# Patient Record
Sex: FEMALE | Race: WHITE | NOT HISPANIC OR LATINO | Employment: PART TIME | URBAN - METROPOLITAN AREA
[De-identification: names, ages, dates, MRNs, and addresses within clinical notes are randomized per-mention and may not be internally consistent; named-entity substitution may affect disease eponyms.]

---

## 2024-03-10 NOTE — PROGRESS NOTES
Assessment/Plan:  Calcium 1200-1500mg + 600-1000 IU Vit D daily. Exercise including weight bearing exercises. Pap with high risk HPV Q 5 years.  Annual mammogram and monthly breast self exam recommended.  Pt with complaints of  vaginal dryness and/or painful intercourse.  Discussed lubricant with silicone based lubricant or coconut oil.  Trial vaginal estrogen. R/B discussed No contraindication to use.  Little absorbed systemically Pt would like to try to use 1 gram vaginally or to use small amount on finger to vulva daily x 14 days then decrease to M-W-F hold here for one month.  If improved/normal can decrease to twice a week.  Titrate to least effective dose maintaining at once per week.        Diagnoses and all orders for this visit:    Encounter for gynecological examination without abnormal finding  -     Thinprep Tis Pap, HPV mRNA E6/E7 Rfx HPV 16,18/45, Chlamydia/N.gonorrhoeae    Encounter for Papanicolaou smear for cervical cancer screening  -     Thinprep Tis Pap, HPV mRNA E6/E7 Rfx HPV 16,18/45, Chlamydia/N.gonorrhoeae    History of breast cancer    Encounter for screening mammogram for malignant neoplasm of breast  -     Mammo screening bilateral w 3d & cad; Future    Personal history of breast cancer  -     Mammo screening bilateral w 3d & cad; Future    Family history of breast cancer  -     Mammo screening bilateral w 3d & cad; Future    Vaginal atrophy  -     estradiol (ESTRACE VAGINAL) 0.1 mg/g vaginal cream; 1 gram in the vaginal daily for 14 days then three times per week    Polyarthritis with positive rheumatoid factor (HCC)    Primary Sjogren's syndrome (HCC)    Screen for STD (sexually transmitted disease)  -     Thinprep Tis Pap, HPV mRNA E6/E7 Rfx HPV 16,18/45, Chlamydia/N.gonorrhoeae    Other orders  -     Liquid-based pap, screening  -     CANNABIDIOL PO; THIS ENTRY IS FOR DOCUMENTATION PURPOSES OF MEDICAL CANNABIS USE ONLY AND DOES NOT INDICATE THE PROVIDER OF NOTE PRESCRIBED THIS  "MEDICATION. THIS ENTRY MAY NOT BE USED TO PRESCRIBE THIS PRODUCT.  -     clonazePAM (KlonoPIN) 0.5 mg tablet; Take 0.5 mg by mouth if needed  -     Cyanocobalamin 1000 MCG CAPS; Take 500 mcg by mouth daily  -     DULoxetine (CYMBALTA) 20 mg capsule; TAKE ONE CAPSULE (20mg) BY MOUTH EVERY DAY  -     hydroxychloroquine (PLAQUENIL) 200 mg tablet; Take 1 tablet by mouth every other day          Subjective:      Patient ID: Kennedi Peterson is a 64 y.o. female.    New/former pt here for annual gyn  Last seen  Sjogrens and + RF with polyarthritis. PH breast cancer  s/p Lumpectomy SLNBx RT and tamoxifen followed by Arimidex  PAP 2019 neg/neg HPV Recently in a new relationship SA having pain  Using coconut oil as lubricant         The following portions of the patient's history were reviewed and updated as appropriate: allergies, current medications, past family history, past medical history, past social history, past surgical history, and problem list.    Review of Systems   Constitutional:  Negative for fatigue and unexpected weight change.   Gastrointestinal:  Negative for constipation and diarrhea.   Genitourinary:  Negative for difficulty urinating, dyspareunia, dysuria, frequency, genital sores, menstrual problem, pelvic pain, urgency, vaginal bleeding, vaginal discharge and vaginal pain.   Neurological:  Negative for headaches.   Psychiatric/Behavioral: Negative.  Negative for dysphoric mood. The patient is not nervous/anxious.          Objective:      /62   Ht 4' 11\" (1.499 m)   Wt 53.3 kg (117 lb 6.4 oz)   Breastfeeding No   BMI 23.71 kg/m²          Physical Exam  Vitals and nursing note reviewed.   Constitutional:       General: She is not in acute distress.     Appearance: Normal appearance.   HENT:      Head: Normocephalic and atraumatic.   Pulmonary:      Effort: Pulmonary effort is normal.   Chest:   Breasts:     Breasts are symmetrical.      Right: Normal. No mass, nipple discharge, skin " change or tenderness.      Left: Normal. No mass, nipple discharge, skin change or tenderness.   Abdominal:      General: There is no distension.      Palpations: Abdomen is soft.      Tenderness: There is no abdominal tenderness. There is no guarding or rebound.   Genitourinary:     General: Normal vulva.      Exam position: Lithotomy position.      Labia:         Right: No rash, tenderness, lesion or injury.         Left: No rash, tenderness, lesion or injury.       Urethra: No prolapse, urethral pain, urethral swelling or urethral lesion.      Vagina: Normal. No erythema or lesions.      Cervix: No cervical motion tenderness, discharge, lesion or cervical bleeding.      Uterus: Normal.       Adnexa: Right adnexa normal and left adnexa normal.        Right: No mass or tenderness.          Left: No mass or tenderness.        Rectum: No mass or external hemorrhoid.      Comments: PAP from cervix   Musculoskeletal:         General: Normal range of motion.   Lymphadenopathy:      Upper Body:      Right upper body: No axillary adenopathy.      Left upper body: No axillary adenopathy.      Lower Body: No right inguinal adenopathy. No left inguinal adenopathy.   Skin:     General: Skin is warm and dry.   Neurological:      Mental Status: She is alert and oriented to person, place, and time.   Psychiatric:         Mood and Affect: Mood normal.         Behavior: Behavior normal.         Thought Content: Thought content normal.         Judgment: Judgment normal.

## 2024-03-11 ENCOUNTER — OFFICE VISIT (OUTPATIENT)
Dept: OBGYN CLINIC | Facility: CLINIC | Age: 64
End: 2024-03-11
Payer: MEDICARE

## 2024-03-11 VITALS
HEIGHT: 59 IN | DIASTOLIC BLOOD PRESSURE: 62 MMHG | WEIGHT: 117.4 LBS | BODY MASS INDEX: 23.67 KG/M2 | SYSTOLIC BLOOD PRESSURE: 102 MMHG

## 2024-03-11 DIAGNOSIS — Z85.3 PERSONAL HISTORY OF BREAST CANCER: ICD-10-CM

## 2024-03-11 DIAGNOSIS — M35.00 PRIMARY SJOGREN'S SYNDROME (HCC): ICD-10-CM

## 2024-03-11 DIAGNOSIS — Z12.4 ENCOUNTER FOR PAPANICOLAOU SMEAR FOR CERVICAL CANCER SCREENING: ICD-10-CM

## 2024-03-11 DIAGNOSIS — N95.2 VAGINAL ATROPHY: ICD-10-CM

## 2024-03-11 DIAGNOSIS — Z01.419 ENCOUNTER FOR GYNECOLOGICAL EXAMINATION WITHOUT ABNORMAL FINDING: Primary | ICD-10-CM

## 2024-03-11 DIAGNOSIS — M05.80 POLYARTHRITIS WITH POSITIVE RHEUMATOID FACTOR (HCC): ICD-10-CM

## 2024-03-11 DIAGNOSIS — Z80.3 FAMILY HISTORY OF BREAST CANCER: ICD-10-CM

## 2024-03-11 DIAGNOSIS — Z12.31 ENCOUNTER FOR SCREENING MAMMOGRAM FOR MALIGNANT NEOPLASM OF BREAST: ICD-10-CM

## 2024-03-11 DIAGNOSIS — Z11.3 SCREEN FOR STD (SEXUALLY TRANSMITTED DISEASE): ICD-10-CM

## 2024-03-11 DIAGNOSIS — Z85.3 HISTORY OF BREAST CANCER: ICD-10-CM

## 2024-03-11 PROCEDURE — G0101 CA SCREEN;PELVIC/BREAST EXAM: HCPCS | Performed by: NURSE PRACTITIONER

## 2024-03-11 RX ORDER — CLONAZEPAM 0.5 MG/1
0.5 TABLET ORAL AS NEEDED
COMMUNITY

## 2024-03-11 RX ORDER — ESTRADIOL 0.1 MG/G
CREAM VAGINAL
Qty: 42.5 G | Refills: 3 | Status: SHIPPED | OUTPATIENT
Start: 2024-03-11

## 2024-03-11 RX ORDER — HYDROXYCHLOROQUINE SULFATE 200 MG/1
1 TABLET, FILM COATED ORAL EVERY OTHER DAY
COMMUNITY
Start: 2024-01-22

## 2024-03-11 RX ORDER — DULOXETIN HYDROCHLORIDE 20 MG/1
CAPSULE, DELAYED RELEASE ORAL
COMMUNITY
Start: 2023-12-20

## 2024-03-12 NOTE — PATIENT INSTRUCTIONS
Calcium 1200-1500mg + 600-1000 IU Vit D daily. Exercise including weight bearing exercises. Pap with high risk HPV Q 5 years.  Annual mammogram and monthly breast self exam recommended.  Pt with complaints of  vaginal dryness and/or painful intercourse.  Discussed lubricant with silicone based lubricant or coconut oil.  Trial vaginal estrogen. R/B discussed No contraindication to use.  Little absorbed systemically Pt would like to try to use 1 gram vaginally or to use small amount on finger to vulva daily x 14 days then decrease to M-W-F hold here for one month.  If improved/normal can decrease to twice a week.  Titrate to least effective dose maintaining at once per week.

## 2024-03-14 LAB
C TRACH RRNA SPEC QL NAA+PROBE: NOT DETECTED
CLINICAL INFO: NORMAL
CYTO CVX: NORMAL
CYTOLOGY CMNT CVX/VAG CYTO-IMP: NORMAL
DATE PREVIOUS BX: NORMAL
HPV E6+E7 MRNA CVX QL NAA+PROBE: NOT DETECTED
LMP START DATE: NORMAL
N GONORRHOEA RRNA SPEC QL NAA+PROBE: NOT DETECTED
SL AMB PREV. PAP:: NORMAL
SPECIMEN SOURCE CVX/VAG CYTO: NORMAL

## 2024-04-30 ENCOUNTER — TELEPHONE (OUTPATIENT)
Age: 64
End: 2024-04-30

## 2024-04-30 NOTE — TELEPHONE ENCOUNTER
Pt calling asking for RN to review directions for applying Estrace vaginal cream and frequency recommended by provider. RN reviewed note from recent visit and provided advice to pt. Pt verbalized an understanding. No further questions at this time.

## 2024-09-27 LAB — HBA1C MFR BLD HPLC: 5.6 %

## 2024-11-27 ENCOUNTER — TELEPHONE (OUTPATIENT)
Age: 64
End: 2024-11-27

## 2025-01-03 ENCOUNTER — TELEPHONE (OUTPATIENT)
Dept: GASTROENTEROLOGY | Facility: CLINIC | Age: 65
End: 2025-01-03

## 2025-01-03 ENCOUNTER — OFFICE VISIT (OUTPATIENT)
Dept: GASTROENTEROLOGY | Facility: CLINIC | Age: 65
End: 2025-01-03
Payer: COMMERCIAL

## 2025-01-03 VITALS
WEIGHT: 122 LBS | HEIGHT: 60 IN | SYSTOLIC BLOOD PRESSURE: 120 MMHG | DIASTOLIC BLOOD PRESSURE: 78 MMHG | BODY MASS INDEX: 23.95 KG/M2

## 2025-01-03 DIAGNOSIS — K59.04 CHRONIC IDIOPATHIC CONSTIPATION: Primary | ICD-10-CM

## 2025-01-03 DIAGNOSIS — Z12.11 COLON CANCER SCREENING: ICD-10-CM

## 2025-01-03 PROCEDURE — 99204 OFFICE O/P NEW MOD 45 MIN: CPT | Performed by: INTERNAL MEDICINE

## 2025-01-03 RX ORDER — FERROUS SULFATE 325(65) MG
1 TABLET ORAL EVERY OTHER DAY
COMMUNITY

## 2025-01-03 RX ORDER — POLYETHYLENE GLYCOL 3350 17 G/17G
238 POWDER, FOR SOLUTION ORAL ONCE
Qty: 238 G | Refills: 0 | Status: SHIPPED | OUTPATIENT
Start: 2025-01-03 | End: 2025-01-03

## 2025-01-03 RX ORDER — PHENOL 1.4 %
600 AEROSOL, SPRAY (ML) MUCOUS MEMBRANE DAILY
COMMUNITY

## 2025-01-03 RX ORDER — SODIUM CHLORIDE, SODIUM LACTATE, POTASSIUM CHLORIDE, CALCIUM CHLORIDE 600; 310; 30; 20 MG/100ML; MG/100ML; MG/100ML; MG/100ML
125 INJECTION, SOLUTION INTRAVENOUS CONTINUOUS
OUTPATIENT
Start: 2025-01-03

## 2025-01-03 NOTE — PROGRESS NOTES
Name: Kennedi Peterson      : 1960      MRN: [id number]  Encounter Provider: Leanna Carney MD  Encounter Date: 1/3/2025   Encounter department: [facility]  :  Assessment & Plan  Chronic idiopathic constipation  64F here today as a new patient with complaints of chronic constipation and incomplete defecation. Some improvement with eating 5 figs a day but still with some difficulty cleaning after a BM. Rectal exam sig for grade 2 prolapsed hemorrhoids.     - Continue high fiber diet, OK to use figs, increase water intake  - Consider hemorrhoid banding after the colonoscopy         Colon cancer screening  Av risk, last colonoscopy 10 years ago.     Orders:  •  Colonoscopy; Future  •  polyethylene glycol (MiraLax) 17 GM/SCOOP powder; Take 238 g by mouth once for 1 dose Take 238 g my mouth. Use as directed        History of Present Illness     Kennedi Peterson is a 64 y.o. female who presents today as a new patient for constipation/incomplete defecation and difficulty keeping the anal area clean.    Pt states she is a vegetarian. Started iron supplements for low ferritin (normal hgb). But prior to this, she has noticed chronic constipation. She started some figs about 5 /day and it has helped keep her bowels softer and more regular. However, despite this, she continues to feel like incomplete evacuation and when she is wiping, she has trouble cleaning all the way. States she had surgery in past for anal skin tags.     Denies sig bleeding. No abd pains/diarrhea. No weight changes. Otherwise healthy. Does take plaquenil for RA.      History obtained from: patient    Review of Systems   Constitutional:  Negative for chills and fever.   HENT:  Negative for ear pain and sore throat.    Eyes:  Negative for pain and visual disturbance.   Respiratory:  Negative for cough and shortness of breath.    Cardiovascular:  Negative for chest pain and palpitations.   Gastrointestinal:  Positive  for constipation. Negative for abdominal pain, blood in stool, nausea and vomiting.   Genitourinary:  Negative for dysuria and hematuria.   Musculoskeletal:  Positive for arthralgias. Negative for back pain.   Skin:  Negative for color change and rash.   Neurological:  Negative for seizures and syncope.   All other systems reviewed and are negative.    Past Medical History   Past Medical History:   Diagnosis Date   • Anxiety    • Breast cancer (HCC)    • Cataracts, bilateral 2013   • Colon polyp    • Depression    • History of screening mammography 2024    Negative per pt. Done at [facility].   • RA (rheumatoid arthritis) (HCC)    • Sjogren's disease (HCC)      Past Surgical History:   Procedure Laterality Date   • BREAST LUMPECTOMY Left     malignancy   • CATARACT EXTRACTION, BILATERAL Bilateral 2013   •  SECTION     • COLONOSCOPY  2015    10 year recall   • COLONOSCOPY     • LYMPH NODE DISSECTION Left      Family History   Problem Relation Age of Onset   • Breast cancer Mother    • Colon cancer Maternal Grandmother    • Uterine cancer Neg Hx    • Ovarian cancer Neg Hx    • Colon polyps Neg Hx       reports that she has quit smoking. Her smoking use included cigarettes. She has never used smokeless tobacco. She reports current alcohol use. She reports current drug use. Drug: Marijuana.  Current Outpatient Medications on File Prior to Visit   Medication Sig Dispense Refill   • calcium carbonate (OS-ISAURA) 600 MG tablet Take 600 mg by mouth daily     • CANNABIDIOL PO THIS ENTRY IS FOR DOCUMENTATION PURPOSES OF MEDICAL CANNABIS USE ONLY AND DOES NOT INDICATE THE PROVIDER OF NOTE PRESCRIBED THIS MEDICATION. THIS ENTRY MAY NOT BE USED TO PRESCRIBE THIS PRODUCT.     • estradiol (ESTRACE VAGINAL) 0.1 mg/g vaginal cream 1 gram in the vaginal daily for 14 days then three times per week 42.5 g 3   • ferrous sulfate 325 (65 Fe) mg tablet Take 1 tablet by mouth every other day     •  hydroxychloroquine (PLAQUENIL) 200 mg tablet Take 1 tablet by mouth every other day     • [DISCONTINUED] clonazePAM (KlonoPIN) 0.5 mg tablet Take 0.5 mg by mouth if needed (Patient not taking: Reported on 1/3/2025)     • [DISCONTINUED] Cyanocobalamin 1000 MCG CAPS Take 500 mcg by mouth daily (Patient not taking: Reported on 1/3/2025)     • [DISCONTINUED] DULoxetine (CYMBALTA) 20 mg capsule TAKE ONE CAPSULE (20mg) BY MOUTH EVERY DAY (Patient not taking: Reported on 1/3/2025)       No current facility-administered medications on file prior to visit.   No Known Allergies   Current Outpatient Medications on File Prior to Visit   Medication Sig Dispense Refill   • calcium carbonate (OS-ISAURA) 600 MG tablet Take 600 mg by mouth daily     • CANNABIDIOL PO THIS ENTRY IS FOR DOCUMENTATION PURPOSES OF MEDICAL CANNABIS USE ONLY AND DOES NOT INDICATE THE PROVIDER OF NOTE PRESCRIBED THIS MEDICATION. THIS ENTRY MAY NOT BE USED TO PRESCRIBE THIS PRODUCT.     • estradiol (ESTRACE VAGINAL) 0.1 mg/g vaginal cream 1 gram in the vaginal daily for 14 days then three times per week 42.5 g 3   • ferrous sulfate 325 (65 Fe) mg tablet Take 1 tablet by mouth every other day     • hydroxychloroquine (PLAQUENIL) 200 mg tablet Take 1 tablet by mouth every other day     • [DISCONTINUED] clonazePAM (KlonoPIN) 0.5 mg tablet Take 0.5 mg by mouth if needed (Patient not taking: Reported on 1/3/2025)     • [DISCONTINUED] Cyanocobalamin 1000 MCG CAPS Take 500 mcg by mouth daily (Patient not taking: Reported on 1/3/2025)     • [DISCONTINUED] DULoxetine (CYMBALTA) 20 mg capsule TAKE ONE CAPSULE (20mg) BY MOUTH EVERY DAY (Patient not taking: Reported on 1/3/2025)       No current facility-administered medications on file prior to visit.      Social History     Tobacco Use   • Smoking status: Former     Types: Cigarettes   • Smokeless tobacco: Never   Vaping Use   • Vaping status: Never Used   Substance and Sexual Activity   • Alcohol use: Yes     Comment:  social   • Drug use: Yes     Types: Marijuana     Comment: medical   • Sexual activity: Yes     Partners: Male     Birth control/protection: Post-menopausal        Objective   /78   Ht 5' (1.524 m)   Wt 55.3 kg (122 lb)   BMI 23.83 kg/m²      Physical Exam  Vitals and nursing note reviewed.   Constitutional:       General: She is not in acute distress.     Appearance: She is well-developed.   HENT:      Head: Normocephalic and atraumatic.   Eyes:      Conjunctiva/sclera: Conjunctivae normal.   Cardiovascular:      Rate and Rhythm: Normal rate and regular rhythm.      Heart sounds: No murmur heard.  Pulmonary:      Effort: Pulmonary effort is normal. No respiratory distress.      Breath sounds: Normal breath sounds.   Abdominal:      General: Abdomen is flat. Bowel sounds are normal. There is no distension.      Palpations: Abdomen is soft.      Tenderness: There is no abdominal tenderness.   Musculoskeletal:         General: No swelling.      Cervical back: Neck supple.   Skin:     General: Skin is warm and dry.   Neurological:      General: No focal deficit present.      Mental Status: She is alert and oriented to person, place, and time.   Psychiatric:         Mood and Affect: Mood normal.

## 2025-01-03 NOTE — TELEPHONE ENCOUNTER
Scheduled date of colonoscopy (as of today):1/15/25  Physician performing colonoscopy:LISA  Location of colonoscopy:[facility]  Bowel prep reviewed with patient:Miralax/Dulcolax  Instructions reviewed with patient by:ANAYELI  Clearances: N

## 2025-01-03 NOTE — ASSESSMENT & PLAN NOTE
64F here today as a new patient with complaints of chronic constipation and incomplete defecation. Some improvement with eating 5 figs a day but still with some difficulty cleaning after a BM. Rectal exam sig for grade 2 prolapsed hemorrhoids.     - Continue high fiber diet, OK to use figs, increase water intake  - Consider hemorrhoid banding after the colonoscopy

## 2025-01-07 ENCOUNTER — TELEPHONE (OUTPATIENT)
Dept: GASTROENTEROLOGY | Facility: CLINIC | Age: 65
End: 2025-01-07

## 2025-01-07 NOTE — TELEPHONE ENCOUNTER
Procedure confirmed  Colonoscopy     Via: Spoke with patient.      Instructions given: Given to Patient at Visit     Prep Given: Miralax/Dulcolax    Call the office if there are any questions.

## 2025-01-14 RX ORDER — SODIUM CHLORIDE 9 MG/ML
100 INJECTION, SOLUTION INTRAVENOUS CONTINUOUS
Status: CANCELLED | OUTPATIENT
Start: 2025-01-14

## 2025-01-15 ENCOUNTER — HOSPITAL ENCOUNTER (OUTPATIENT)
Dept: GASTROENTEROLOGY | Facility: HOSPITAL | Age: 65
Setting detail: OUTPATIENT SURGERY
Discharge: HOME | End: 2025-01-15
Attending: INTERNAL MEDICINE
Payer: COMMERCIAL

## 2025-01-15 ENCOUNTER — ANESTHESIA (OUTPATIENT)
Dept: GASTROENTEROLOGY | Facility: HOSPITAL | Age: 65
End: 2025-01-15
Payer: COMMERCIAL

## 2025-01-15 ENCOUNTER — ANESTHESIA EVENT (OUTPATIENT)
Dept: GASTROENTEROLOGY | Facility: HOSPITAL | Age: 65
End: 2025-01-15
Payer: COMMERCIAL

## 2025-01-15 VITALS
TEMPERATURE: 97.9 F | RESPIRATION RATE: 20 BRPM | SYSTOLIC BLOOD PRESSURE: 92 MMHG | WEIGHT: 118 LBS | HEART RATE: 79 BPM | OXYGEN SATURATION: 95 % | DIASTOLIC BLOOD PRESSURE: 54 MMHG | HEIGHT: 60 IN | BODY MASS INDEX: 23.16 KG/M2

## 2025-01-15 DIAGNOSIS — Z12.11 COLON CANCER SCREENING: ICD-10-CM

## 2025-01-15 PROCEDURE — 88305 TISSUE EXAM BY PATHOLOGIST: CPT | Performed by: STUDENT IN AN ORGANIZED HEALTH CARE EDUCATION/TRAINING PROGRAM

## 2025-01-15 PROCEDURE — 45385 COLONOSCOPY W/LESION REMOVAL: CPT | Performed by: INTERNAL MEDICINE

## 2025-01-15 RX ORDER — SODIUM CHLORIDE 9 MG/ML
INJECTION, SOLUTION INTRAVENOUS CONTINUOUS PRN
Status: DISCONTINUED | OUTPATIENT
Start: 2025-01-15 | End: 2025-01-15

## 2025-01-15 RX ORDER — SODIUM CHLORIDE 9 MG/ML
100 INJECTION, SOLUTION INTRAVENOUS CONTINUOUS
Status: DISCONTINUED | OUTPATIENT
Start: 2025-01-15 | End: 2025-01-19 | Stop reason: HOSPADM

## 2025-01-15 RX ORDER — SODIUM CHLORIDE, SODIUM LACTATE, POTASSIUM CHLORIDE, CALCIUM CHLORIDE 600; 310; 30; 20 MG/100ML; MG/100ML; MG/100ML; MG/100ML
125 INJECTION, SOLUTION INTRAVENOUS CONTINUOUS
Status: DISCONTINUED | OUTPATIENT
Start: 2025-01-15 | End: 2025-01-19 | Stop reason: HOSPADM

## 2025-01-15 RX ORDER — PROPOFOL 10 MG/ML
INJECTION, EMULSION INTRAVENOUS AS NEEDED
Status: DISCONTINUED | OUTPATIENT
Start: 2025-01-15 | End: 2025-01-15

## 2025-01-15 RX ORDER — PHENYLEPHRINE HCL IN 0.9% NACL 1 MG/10 ML
SYRINGE (ML) INTRAVENOUS AS NEEDED
Status: DISCONTINUED | OUTPATIENT
Start: 2025-01-15 | End: 2025-01-15

## 2025-01-15 RX ADMIN — PROPOFOL 150 MG: 10 INJECTION, EMULSION INTRAVENOUS at 14:36

## 2025-01-15 RX ADMIN — Medication 50 MCG: at 14:42

## 2025-01-15 RX ADMIN — SODIUM CHLORIDE: 0.9 INJECTION, SOLUTION INTRAVENOUS at 13:00

## 2025-01-15 RX ADMIN — PROPOFOL 20 MG: 10 INJECTION, EMULSION INTRAVENOUS at 14:48

## 2025-01-15 RX ADMIN — PROPOFOL 50 MG: 10 INJECTION, EMULSION INTRAVENOUS at 14:40

## 2025-01-15 RX ADMIN — SODIUM CHLORIDE: 0.9 INJECTION, SOLUTION INTRAVENOUS at 14:19

## 2025-01-15 RX ADMIN — PROPOFOL 30 MG: 10 INJECTION, EMULSION INTRAVENOUS at 14:44

## 2025-01-15 NOTE — H&P
[facility]    Kennedi Peterson 64 y.o. female MRN: [id number]      HPI: Kennedi Peterson is a 64 y.o. female who presents for colon cancer screening.    No Known Allergies      REVIEW OF SYSTEMS: Per the HPI, and otherwise unremarkable.    Historical Information     Past Medical History:   Diagnosis Date    Anxiety     Breast cancer (HCC)     Cataracts, bilateral 2013    Colon polyp     Depression     History of screening mammography 2024    Negative per pt. Done at [facility].    RA (rheumatoid arthritis) (HCC)     Sjogren's disease (HCC)      Past Surgical History:   Procedure Laterality Date    BREAST LUMPECTOMY Left     malignancy    CATARACT EXTRACTION, BILATERAL Bilateral 2013     SECTION      COLONOSCOPY  2015    10 year recall    COLONOSCOPY      LYMPH NODE DISSECTION Left      Social History   Social History     Substance and Sexual Activity   Alcohol Use Yes    Comment: social     Social History     Substance and Sexual Activity   Drug Use Yes    Types: Marijuana    Comment: medical     Social History     Tobacco Use   Smoking Status Former    Types: Cigarettes   Smokeless Tobacco Never     Family History   Problem Relation Age of Onset    Breast cancer Mother     Colon cancer Maternal Grandmother     Uterine cancer Neg Hx     Ovarian cancer Neg Hx     Colon polyps Neg Hx        Meds/Allergies       Current Outpatient Medications:     calcium carbonate (OS-ISAURA) 600 MG tablet    ferrous sulfate 325 (65 Fe) mg tablet    NON FORMULARY    [employer] WORT PO    CANNABIDIOL PO    estradiol (ESTRACE VAGINAL) 0.1 mg/g vaginal cream    hydroxychloroquine (PLAQUENIL) 200 mg tablet    polyethylene glycol (MiraLax) 17 GM/SCOOP powder    Current Facility-Administered Medications:     lactated ringers infusion, 125 mL/hr, Intravenous, Continuous    sodium chloride 0.9 % infusion, 100 mL/hr, Intravenous, Continuous    Facility-Administered  Medications Ordered in Other Encounters:     sodium chloride 0.9 % infusion, , Intravenous, Continuous PRN, New Bag at 01/15/25 1300        Objective     /58   Pulse 71   Temp 97.9 °F (36.6 °C) (Temporal)   Resp 16   Ht 5' (1.524 m)   Wt 53.5 kg (118 lb)   SpO2 100%   BMI 23.05 kg/m²       PHYSICAL EXAM    Gen: NAD AAOx3  Head: Normocephalic, Atraumatic  CV: S1S2 RRR no m/r/g  CHEST: Clear b/l no c/r/w  ABD: soft, +BS NT/ND no masses  EXT: no edema      ASSESSMENT/PLAN:  This is a 64 y.o. year old female here for colonoscopy, and she is stable and optimized for her procedure.

## 2025-01-15 NOTE — ANESTHESIA PREPROCEDURE EVALUATION
Procedure:  COLONOSCOPY    Relevant Problems   ANESTHESIA (within normal limits)      CARDIO (within normal limits)      GYN   (+) Breast cancer (HCC) (left)      NEURO/PSYCH   (+) Anxiety   (+) Depression      PULMONARY  Medical marijuana use - smokes   (-) Sleep apnea   (-) Smoking   (-) URI (upper respiratory infection)      Rheumatology   (+) Polyarthritis with positive rheumatoid factor (HCC)   (+) Primary Sjogren's syndrome (HCC)      FEN/Gastrointestinal   (+) Chronic idiopathic constipation      Physical Exam    Airway    Mallampati score: I  TM Distance: >3 FB  Neck ROM: full     Dental   No notable dental hx     Cardiovascular      Pulmonary      Other Findings  post-pubertal.    Lab Results   Component Value Date    HGBA1C 5.6 09/27/2024     Anesthesia Plan  ASA Score- 2     Anesthesia Type- IV sedation with anesthesia with ASA Monitors.         Additional Monitors:     Airway Plan:            Plan Factors-Exercise tolerance (METS): >4 METS.    Chart reviewed.   Existing labs reviewed. Patient summary reviewed.    Patient is a current smoker (MMALBERT).              Induction- intravenous.    Postoperative Plan-         Informed Consent- Anesthetic plan and risks discussed with patient.  I personally reviewed this patient with the CRNA. Discussed and agreed on the Anesthesia Plan with the CRNA..      NPO Status:  Vitals Value Taken Time   Date of last liquid 01/15/25 01/15/25 1409   Time of last liquid 0800 01/15/25 1409   Date of last solid 01/13/25 01/15/25 1409   Time of last solid

## 2025-01-15 NOTE — ANESTHESIA POSTPROCEDURE EVALUATION
Post-Op Assessment Note    CV Status:  Stable    Pain management: adequate       Mental Status:  Alert and awake   Hydration Status:  Euvolemic   PONV Controlled:  Controlled   Airway Patency:  Patent     Post Op Vitals Reviewed: Yes    No anethesia notable event occurred.    Staff: CRNA       Last Filed PACU Vitals:  Vitals Value Taken Time   Temp     Pulse 78 01/15/25 1458   BP 88/51 01/15/25 1458   Resp 21 01/15/25 1458   SpO2 98 % 01/15/25 1458       Modified [place]:     Vitals Value Taken Time   Activity 2 01/15/25 1459   Respiration 2 01/15/25 1459   Circulation 2 01/15/25 1459   Consciousness 2 01/15/25 1459   Oxygen Saturation 2 01/15/25 1459     Modified [place]ma Score: 10

## 2025-01-17 PROCEDURE — 88305 TISSUE EXAM BY PATHOLOGIST: CPT | Performed by: STUDENT IN AN ORGANIZED HEALTH CARE EDUCATION/TRAINING PROGRAM

## 2025-01-22 ENCOUNTER — RESULTS FOLLOW-UP (OUTPATIENT)
Dept: GASTROENTEROLOGY | Facility: CLINIC | Age: 65
End: 2025-01-22

## 2025-02-18 ENCOUNTER — OFFICE VISIT (OUTPATIENT)
Dept: GASTROENTEROLOGY | Facility: CLINIC | Age: 65
End: 2025-02-18
Payer: COMMERCIAL

## 2025-02-18 VITALS
DIASTOLIC BLOOD PRESSURE: 60 MMHG | BODY MASS INDEX: 23.95 KG/M2 | SYSTOLIC BLOOD PRESSURE: 100 MMHG | HEIGHT: 60 IN | WEIGHT: 122 LBS

## 2025-02-18 DIAGNOSIS — K64.1 GRADE II HEMORRHOIDS: Primary | ICD-10-CM

## 2025-02-18 DIAGNOSIS — Z86.0101 PERSONAL HISTORY OF ADENOMATOUS AND SERRATED COLON POLYPS: ICD-10-CM

## 2025-02-18 PROCEDURE — 46221 LIGATION OF HEMORRHOID(S): CPT | Performed by: INTERNAL MEDICINE

## 2025-02-18 NOTE — PROGRESS NOTES
[facility] - Hemorrhoid Banding Kennedi Peterson 65 y.o. female MRN: [id number]  Encounter: [id number]    ASSESSMENT AND PLAN:    The left lateral hemorrhoid area was banded today. The patient was instructed to avoid constipation and straining, and educated in appropriate fiber intake.     HPI: Kennedi Peterson is a 65 y.o. year old female who presents with bleeding and prolapse due to hemorrhoids.     Previous treatments include: Fiber, OTC  Bands were previously placed: None  Current Fiber intake: Dietary  Complications of prior therapy include: None    The patient provided consent for banding  and was placed in the left lateral position  Rectal exam showed grade 2 hemorrhoids  The [place] ligator was advanced and a band was applied without difficulty   Repeat rectal exam confirmed appropriate placement  The patient was discharged home after observation in the waiting area

## 2025-03-06 ENCOUNTER — OFFICE VISIT (OUTPATIENT)
Dept: GASTROENTEROLOGY | Facility: CLINIC | Age: 65
End: 2025-03-06
Payer: COMMERCIAL

## 2025-03-06 VITALS
SYSTOLIC BLOOD PRESSURE: 110 MMHG | DIASTOLIC BLOOD PRESSURE: 64 MMHG | BODY MASS INDEX: 23.95 KG/M2 | HEIGHT: 60 IN | WEIGHT: 122 LBS

## 2025-03-06 DIAGNOSIS — K64.1 GRADE II HEMORRHOIDS: Primary | ICD-10-CM

## 2025-03-06 PROCEDURE — 46221 LIGATION OF HEMORRHOID(S): CPT | Performed by: INTERNAL MEDICINE

## 2025-03-06 NOTE — PROGRESS NOTES
[facility] - Hemorrhoid Banding Kennedi Peterson 65 y.o. female MRN: [id number]  Encounter: [id number]    ASSESSMENT AND PLAN:    The right anterior hemorrhoid area was banded today. The patient was instructed to avoid constipation and straining, and educated in appropriate fiber intake.     HPI: Kennedi Peterson is a 65 y.o. year old female who presents with bleeding and prolapse due to hemorrhoids.     Previous treatments include: Fiber, OTC  Bands were previously placed: LL  Current Fiber intake: Dietary  Complications of prior therapy include: None    The patient provided consent for banding  and was placed in the left lateral position  Rectal exam showed grade 2 hemorrhoids  The [place] ligator was advanced and a band was applied without difficulty   Repeat rectal exam confirmed appropriate placement  The patient was discharged home after observation in the waiting area

## 2025-03-16 NOTE — PROGRESS NOTES
Assessment/Plan:  Calcium 1200-1500mg + 600-1000 IU Vit D daily. Exercise  including weight bearing exercises.   Pap with high risk HPV Q 5 years.    Annual mammogram and monthly breast self exam recommended.    Colonoscopy- Up to date          Silicone based lubricant with sex. Vaginal moisturizers twice weekly as needed.   Cont vaginal estrogen 2-3 time per week Call when need a refill        Diagnoses and all orders for this visit:    Encounter for gynecological examination without abnormal finding    Encounter for screening mammogram for malignant neoplasm of breast  -     Mammo screening bilateral w 3d and cad; Future    Personal history of breast cancer  -     Mammo screening bilateral w 3d and cad; Future    Family history of breast cancer  -     Mammo screening bilateral w 3d and cad; Future    Vaginal atrophy  Comments:  will call when needs refill of Estrace    Primary Sjogren's syndrome (HCC)    Other orders  -     Liquid-based pap, screening          Subjective:      Patient ID: Kennedi Peterson is a 65 y.o. female.    here for annual gyn  Last seen 3/2024 Sjogrens and + RF with polyarthritis. PH breast cancer  s/p Lumpectomy SLNBx RT and tamoxifen followed by Arimidex PAP 3/11/2024 neg/neg HPV prior 2019 neg/neg HPV SA was having pain Using coconut oil as lubricant prescribed vaginal estrogen and much bettter. 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         The following portions of the patient's history were reviewed and updated as appropriate: allergies, current medications, past family history, past medical history, past social history, past surgical history, and problem list.    Review of Systems   Constitutional:  Negative for fatigue and unexpected weight change.   Gastrointestinal:  Negative for abdominal distention, abdominal pain, constipation and diarrhea.    Genitourinary:  Negative for difficulty urinating, dyspareunia, dysuria, frequency, genital sores, menstrual problem, pelvic pain, urgency, vaginal bleeding, vaginal discharge and vaginal pain.   Neurological:  Negative for headaches.   Psychiatric/Behavioral: Negative.  Negative for dysphoric mood. The patient is not nervous/anxious.          Objective:      /64 (BP Location: Right arm, Patient Position: Sitting, Cuff Size: Standard)   Ht 5' (1.524 m)   Wt 54.4 kg (120 lb)   BMI 23.44 kg/m²          Physical Exam  Vitals and nursing note reviewed.   Constitutional:       General: She is not in acute distress.     Appearance: Normal appearance.   HENT:      Head: Normocephalic and atraumatic.   Pulmonary:      Effort: Pulmonary effort is normal.   Chest:   Breasts:     Breasts are symmetrical.      Right: Normal. No mass, nipple discharge, skin change or tenderness.      Left: Normal. No mass, nipple discharge, skin change or tenderness.   Abdominal:      General: There is no distension.      Palpations: Abdomen is soft.      Tenderness: There is no abdominal tenderness. There is no guarding or rebound.   Genitourinary:     General: Normal vulva.      Exam position: Lithotomy position.      Labia:         Right: No rash, tenderness, lesion or injury.         Left: No rash, tenderness, lesion or injury.       Urethra: No prolapse, urethral pain, urethral swelling or urethral lesion.      Vagina: Normal. No erythema or lesions.      Cervix: No cervical motion tenderness, discharge, lesion or cervical bleeding.      Uterus: Normal.       Adnexa: Right adnexa normal and left adnexa normal.        Right: No mass or tenderness.          Left: No mass or tenderness.        Rectum: No mass or external hemorrhoid.   Musculoskeletal:         General: Normal range of motion.   Lymphadenopathy:      Upper Body:      Right upper body: No axillary adenopathy.      Left upper body: No axillary adenopathy.      Lower  Body: No right inguinal adenopathy. No left inguinal adenopathy.   Skin:     General: Skin is warm and dry.   Neurological:      Mental Status: She is alert and oriented to person, place, and time.   Psychiatric:         Mood and Affect: Mood normal.         Behavior: Behavior normal.         Thought Content: Thought content normal.         Judgment: Judgment normal.

## 2025-03-17 ENCOUNTER — ANNUAL EXAM (OUTPATIENT)
Dept: OBGYN CLINIC | Facility: CLINIC | Age: 65
End: 2025-03-17
Payer: COMMERCIAL

## 2025-03-17 VITALS
HEIGHT: 60 IN | DIASTOLIC BLOOD PRESSURE: 64 MMHG | WEIGHT: 120 LBS | SYSTOLIC BLOOD PRESSURE: 106 MMHG | BODY MASS INDEX: 23.56 KG/M2

## 2025-03-17 DIAGNOSIS — N95.2 VAGINAL ATROPHY: ICD-10-CM

## 2025-03-17 DIAGNOSIS — M35.00 PRIMARY SJOGREN'S SYNDROME (HCC): ICD-10-CM

## 2025-03-17 DIAGNOSIS — Z85.3 PERSONAL HISTORY OF BREAST CANCER: ICD-10-CM

## 2025-03-17 DIAGNOSIS — Z12.31 ENCOUNTER FOR SCREENING MAMMOGRAM FOR MALIGNANT NEOPLASM OF BREAST: ICD-10-CM

## 2025-03-17 DIAGNOSIS — Z01.419 ENCOUNTER FOR GYNECOLOGICAL EXAMINATION WITHOUT ABNORMAL FINDING: Primary | ICD-10-CM

## 2025-03-17 DIAGNOSIS — Z80.3 FAMILY HISTORY OF BREAST CANCER: ICD-10-CM

## 2025-03-17 PROCEDURE — G0101 CA SCREEN;PELVIC/BREAST EXAM: HCPCS | Performed by: NURSE PRACTITIONER

## 2025-03-17 NOTE — PATIENT INSTRUCTIONS
Calcium 1200-1500mg + 600-1000 IU Vit D daily. Exercise  including weight bearing exercises.   Pap with high risk HPV Q 5 years.    Annual mammogram and monthly breast self exam recommended.    Colonoscopy- Up to date          Silicone based lubricant with sex. Vaginal moisturizers twice weekly as needed.   Cont vaginal estrogen 2-3 time per week Call when need a refill

## 2025-07-10 ENCOUNTER — OFFICE VISIT (OUTPATIENT)
Dept: GASTROENTEROLOGY | Facility: CLINIC | Age: 65
End: 2025-07-10
Payer: COMMERCIAL

## 2025-07-10 VITALS
DIASTOLIC BLOOD PRESSURE: 64 MMHG | SYSTOLIC BLOOD PRESSURE: 110 MMHG | BODY MASS INDEX: 23.36 KG/M2 | HEIGHT: 60 IN | WEIGHT: 119 LBS

## 2025-07-10 DIAGNOSIS — K64.1 GRADE II HEMORRHOIDS: Primary | ICD-10-CM

## 2025-07-10 PROCEDURE — PBNCHG PB NO CHARGE PLACEHOLDER: Performed by: INTERNAL MEDICINE

## 2025-07-10 PROCEDURE — 46221 LIGATION OF HEMORRHOID(S): CPT | Performed by: INTERNAL MEDICINE

## 2025-07-10 NOTE — PROGRESS NOTES
[facility] - Hemorrhoid Banding Kennedi Peterson 65 y.o. female MRN: [id number]  Encounter: [id number]    ASSESSMENT AND PLAN:    The right posterior hemorrhoid area was banded today. The patient was instructed to avoid constipation and straining, and educated in appropriate fiber intake.     HPI: Kennedi Peterson is a 65 y.o. year old female who presents with bleeding and prolapse due to hemorrhoids.     Previous treatments include: fiber OTC  Bands were previously placed: NBA SALINAS   Current Fiber intake: dietary  Complications of prior therapy include: none    The patient provided consent for banding  and was placed in the left lateral position  Rectal exam showed grade 2 hemorrhoids  The [place] ligator was advanced and a band was applied without difficulty   Repeat rectal exam confirmed appropriate placement  The patient was discharged home after observation.

## 2025-08-13 PROBLEM — N95.1 VAGINAL DRYNESS, MENOPAUSAL: Status: ACTIVE | Noted: 2025-08-13

## 2025-08-13 PROBLEM — Z86.000 H/O CARCINOMA IN SITU OF BREAST: Status: ACTIVE | Noted: 2025-08-13

## 2025-08-13 PROBLEM — M19.90 OSTEOARTHRITIS: Status: ACTIVE | Noted: 2025-08-13

## 2025-08-13 PROBLEM — K64.4 ANAL SKIN TAG: Status: ACTIVE | Noted: 2025-08-13

## 2025-08-13 PROBLEM — M25.50 POLYARTHRALGIA: Status: ACTIVE | Noted: 2025-08-13

## 2025-08-13 PROBLEM — M54.50 LOW BACK PAIN: Status: ACTIVE | Noted: 2025-08-13

## 2025-08-13 PROBLEM — E53.8 VITAMIN B12 DEFICIENCY: Status: ACTIVE | Noted: 2025-08-13

## 2025-08-13 PROBLEM — M54.31 RIGHT SCIATIC NERVE PAIN: Status: ACTIVE | Noted: 2025-08-13

## 2025-08-13 PROBLEM — R92.30 DENSE BREASTS: Status: ACTIVE | Noted: 2025-08-13

## 2025-08-13 PROBLEM — M25.512 LEFT SHOULDER PAIN: Status: ACTIVE | Noted: 2025-08-13

## 2025-08-13 PROBLEM — Z13.71 BRCA NEGATIVE: Status: ACTIVE | Noted: 2025-08-13

## 2025-08-13 PROBLEM — F98.8 ADD (ATTENTION DEFICIT DISORDER): Status: ACTIVE | Noted: 2025-08-13

## 2025-08-13 PROBLEM — N93.8 DUB (DYSFUNCTIONAL UTERINE BLEEDING): Status: ACTIVE | Noted: 2025-08-13

## 2025-08-13 PROBLEM — N12 PYELONEPHRITIS: Status: ACTIVE | Noted: 2025-08-13

## 2025-08-13 PROBLEM — Z98.890 HISTORY OF LUMPECTOMY OF LEFT BREAST: Status: ACTIVE | Noted: 2025-08-13

## 2025-08-13 PROBLEM — R32 URINARY INCONTINENCE: Status: ACTIVE | Noted: 2025-08-13

## 2025-08-13 PROBLEM — F43.10 PTSD (POST-TRAUMATIC STRESS DISORDER): Status: ACTIVE | Noted: 2025-08-13

## 2025-08-13 PROBLEM — Z85.3 HISTORY OF LEFT BREAST CANCER: Status: ACTIVE | Noted: 2025-08-13

## 2025-08-13 PROBLEM — M72.2 PLANTAR FASCIITIS, RIGHT: Status: ACTIVE | Noted: 2025-08-13

## 2025-08-13 PROBLEM — H26.9 CATARACT: Status: ACTIVE | Noted: 2025-08-13

## 2025-08-13 PROBLEM — M81.0 OSTEOPOROSIS: Status: ACTIVE | Noted: 2025-08-13

## 2025-08-13 PROBLEM — M54.16 LUMBAR RADICULOPATHY: Status: ACTIVE | Noted: 2025-08-13

## 2025-08-21 ENCOUNTER — OFFICE VISIT (OUTPATIENT)
Age: 65
End: 2025-08-21
Payer: COMMERCIAL

## 2025-08-21 VITALS
OXYGEN SATURATION: 99 % | WEIGHT: 119 LBS | DIASTOLIC BLOOD PRESSURE: 58 MMHG | SYSTOLIC BLOOD PRESSURE: 90 MMHG | HEART RATE: 76 BPM | HEIGHT: 60 IN | BODY MASS INDEX: 23.36 KG/M2

## 2025-08-21 DIAGNOSIS — M05.80 POLYARTHRITIS WITH POSITIVE RHEUMATOID FACTOR (HCC): Primary | ICD-10-CM

## 2025-08-21 DIAGNOSIS — M05.80 POLYARTHRITIS WITH POSITIVE RHEUMATOID FACTOR (HCC): ICD-10-CM

## 2025-08-21 DIAGNOSIS — Z00.00 ANNUAL PHYSICAL EXAM: ICD-10-CM

## 2025-08-21 DIAGNOSIS — B35.1 ONYCHOMYCOSIS: ICD-10-CM

## 2025-08-21 DIAGNOSIS — R21 SKIN RASH: Primary | ICD-10-CM

## 2025-08-21 PROBLEM — E53.8 B12 DEFICIENCY: Status: ACTIVE | Noted: 2022-12-19

## 2025-08-21 PROCEDURE — 99213 OFFICE O/P EST LOW 20 MIN: CPT | Performed by: FAMILY MEDICINE

## 2025-08-21 RX ORDER — BETAMETHASONE DIPROPIONATE 0.5 MG/G
OINTMENT, AUGMENTED TOPICAL 2 TIMES DAILY
Qty: 45 G | Refills: 0 | Status: SHIPPED | OUTPATIENT
Start: 2025-08-21

## 2025-08-21 RX ORDER — TERBINAFINE HYDROCHLORIDE 250 MG/1
250 TABLET ORAL DAILY
Qty: 90 TABLET | Refills: 0 | Status: SHIPPED | OUTPATIENT
Start: 2025-08-21 | End: 2025-11-19